# Patient Record
Sex: MALE | Race: BLACK OR AFRICAN AMERICAN | NOT HISPANIC OR LATINO | Employment: FULL TIME | URBAN - METROPOLITAN AREA
[De-identification: names, ages, dates, MRNs, and addresses within clinical notes are randomized per-mention and may not be internally consistent; named-entity substitution may affect disease eponyms.]

---

## 2024-01-04 ENCOUNTER — APPOINTMENT (OUTPATIENT)
Dept: RADIOLOGY | Age: 59
End: 2024-01-04
Payer: COMMERCIAL

## 2024-01-04 ENCOUNTER — OFFICE VISIT (OUTPATIENT)
Dept: OBGYN CLINIC | Facility: CLINIC | Age: 59
End: 2024-01-04
Payer: COMMERCIAL

## 2024-01-04 VITALS
HEART RATE: 93 BPM | SYSTOLIC BLOOD PRESSURE: 152 MMHG | BODY MASS INDEX: 26.55 KG/M2 | HEIGHT: 72 IN | DIASTOLIC BLOOD PRESSURE: 89 MMHG | WEIGHT: 196 LBS

## 2024-01-04 DIAGNOSIS — M25.462 EFFUSION OF LEFT KNEE: ICD-10-CM

## 2024-01-04 DIAGNOSIS — M25.561 PAIN IN BOTH KNEES, UNSPECIFIED CHRONICITY: ICD-10-CM

## 2024-01-04 DIAGNOSIS — M25.562 PAIN IN BOTH KNEES, UNSPECIFIED CHRONICITY: ICD-10-CM

## 2024-01-04 DIAGNOSIS — M23.92 INTERNAL DERANGEMENT OF LEFT KNEE: ICD-10-CM

## 2024-01-04 DIAGNOSIS — M25.561 PAIN IN BOTH KNEES, UNSPECIFIED CHRONICITY: Primary | ICD-10-CM

## 2024-01-04 DIAGNOSIS — M25.562 PAIN IN BOTH KNEES, UNSPECIFIED CHRONICITY: Primary | ICD-10-CM

## 2024-01-04 PROCEDURE — 73562 X-RAY EXAM OF KNEE 3: CPT

## 2024-01-04 PROCEDURE — 73564 X-RAY EXAM KNEE 4 OR MORE: CPT

## 2024-01-04 PROCEDURE — 99203 OFFICE O/P NEW LOW 30 MIN: CPT | Performed by: STUDENT IN AN ORGANIZED HEALTH CARE EDUCATION/TRAINING PROGRAM

## 2024-01-04 NOTE — PROGRESS NOTES
Date: 24  Suhas Jones   MRN# [id number]  : 1965      Chief Complaint: left knee pain/suspicious for acute medial meniscal tear     Assessment and Plan:    Internal derangement left knee  -WBAT  -Activity modification to limit strain or impact on the joint  -ibuprofen (Motrin) as needed  -Tylenol 1000mg up to three times daily as needed. Do not exceed 3000mg daily  -Knee sleeve or brace for comfort  -MRI left knee ordered as patient's exam/clinical findings suspicious for medial meniscal tear, positive joint line tenderness, positive medial Cheng.   -Call patient once MRI done and if positive findings will refer to Dr Redman for arthroscopic procedure.     Subjective:     Knee Pain  Patient complains of left knee pain. This is evaluated as a personal injury. The pain began 4 month ago. Initial twisting injury in August, immediate swelling, 2nd twisting injury in October. Concerned for medial meniscal tear. The pain is located medial.  He describes the symptoms as sharp and stabbing. Symptoms improve with rest, ice. The symptoms are worse with deep knee bending, pivoting or twisting motion, lateral movements. The knee has given out or felt unstable. The patient cannot bend and straighten the knee fully.   Treatment to date has been ice, NSAID's, knee sleeve/brace, with out significant relief.  History ACL reconstruction  in [place].    External Records Reviewed: historical medical records and lab reports    Allergy:  No Known Allergies  Medications:  all current active meds have been reviewed  Past Medical History:  History reviewed. No pertinent past medical history.  Past Surgical History:  Past Surgical History:   Procedure Laterality Date    FOOT SURGERY Right 2015    HAND SURGERY Right     KNEE SURGERY Right      Family History:  Family History   Problem Relation Age of Onset    Leukemia Mother     No Known Problems Father      Social History:  Social History     Substance and  Sexual Activity   Alcohol Use None     Social History     Substance and Sexual Activity   Drug Use Not on file     Social History     Tobacco Use   Smoking Status Never    Passive exposure: Never   Smokeless Tobacco Never           ROS:   Review of Systems   Constitutional:  Negative for chills and fever.   HENT:  Negative for ear pain and sore throat.    Eyes:  Negative for pain and visual disturbance.   Respiratory:  Negative for cough and shortness of breath.    Cardiovascular:  Negative for chest pain and palpitations.   Gastrointestinal:  Negative for abdominal pain and vomiting.   Genitourinary:  Negative for dysuria and hematuria.   Musculoskeletal:  Positive for arthralgias (left knee). Negative for back pain.   Skin:  Negative for color change and rash.   Neurological:  Negative for seizures and syncope.   All other systems reviewed and are negative.       Objective:   BP Readings from Last 1 Encounters:   01/04/24 152/89      Wt Readings from Last 1 Encounters:   01/04/24 88.9 kg (196 lb)      Pulse Readings from Last 1 Encounters:   01/04/24 93      BMI: Estimated body mass index is 26.58 kg/m² as calculated from the following:    Height as of this encounter: 6' (1.829 m).    Weight as of this encounter: 88.9 kg (196 lb).        Physical Exam  Constitutional:       Appearance: He is well-developed.   Eyes:      Pupils: Pupils are equal, round, and reactive to light.   Pulmonary:      Effort: Pulmonary effort is normal.      Breath sounds: Normal breath sounds.   Musculoskeletal:         General: Tenderness (left knee arthralgia) present.   Skin:     General: Skin is warm and dry.   Neurological:      Mental Status: He is alert and oriented to person, place, and time.      Deep Tendon Reflexes: Reflexes are normal and symmetric.   Psychiatric:         Behavior: Behavior normal.         Thought Content: Thought content normal.         Judgment: Judgment normal.          Gait and Station:    antalgic    Bilateral Lower Extremity:  Left Knee:      Inspection:  normal color, temperature, turgor and moisture    Overall limb alignment: varus    Effusion: significant    ROM 0 to 110 with pain    Extensor Lag: Absent    Palpation: Medial joint line tenderness to palpation    stable to AP translation at 90 deg    Positive medial Cheng     Coronal plane stable in full extension    Coronal plane stable in mid-flexion     Motor: 5/5 EHL/FHL/TA/GS/Qd/Hs    Vascular: Toes WWP with BCR    Sensory: SILT DP/SP/Kuldip/Saph/Tib      Images:    I personally reviewed relevant images in the PACS system and my interpretation is as follows:  X-rays of the left knee reveal mild degenerative changes with joint space narrowing and osteophyte formation        Efrain De La Torre MD  Adult Reconstruction Specialist   [facility]    [employer]

## 2024-01-11 ENCOUNTER — HOSPITAL ENCOUNTER (OUTPATIENT)
Dept: MRI IMAGING | Facility: HOSPITAL | Age: 59
End: 2024-01-11
Attending: STUDENT IN AN ORGANIZED HEALTH CARE EDUCATION/TRAINING PROGRAM

## 2024-01-11 DIAGNOSIS — M25.462 EFFUSION OF LEFT KNEE: ICD-10-CM

## 2024-01-11 DIAGNOSIS — M23.92 INTERNAL DERANGEMENT OF LEFT KNEE: ICD-10-CM

## 2024-01-11 PROCEDURE — 73721 MRI JNT OF LWR EXTRE W/O DYE: CPT

## 2024-01-11 PROCEDURE — G1004 CDSM NDSC: HCPCS

## 2024-01-18 ENCOUNTER — OFFICE VISIT (OUTPATIENT)
Dept: OBGYN CLINIC | Facility: CLINIC | Age: 59
End: 2024-01-18
Payer: COMMERCIAL

## 2024-01-18 VITALS
WEIGHT: 198 LBS | SYSTOLIC BLOOD PRESSURE: 144 MMHG | BODY MASS INDEX: 26.82 KG/M2 | HEIGHT: 72 IN | DIASTOLIC BLOOD PRESSURE: 85 MMHG | HEART RATE: 79 BPM

## 2024-01-18 DIAGNOSIS — S83.232D COMPLEX TEAR OF MEDIAL MENISCUS OF LEFT KNEE AS CURRENT INJURY, SUBSEQUENT ENCOUNTER: Primary | ICD-10-CM

## 2024-01-18 DIAGNOSIS — M17.12 PRIMARY OSTEOARTHRITIS OF LEFT KNEE: ICD-10-CM

## 2024-01-18 PROBLEM — S83.232A COMPLEX TEAR OF MEDIAL MENISCUS OF LEFT KNEE AS CURRENT INJURY: Status: ACTIVE | Noted: 2024-01-18

## 2024-01-18 PROCEDURE — 99214 OFFICE O/P EST MOD 30 MIN: CPT | Performed by: STUDENT IN AN ORGANIZED HEALTH CARE EDUCATION/TRAINING PROGRAM

## 2024-01-18 NOTE — PROGRESS NOTES
Date: 24  Suhas Jones   MRN# [id number]  : 1965      Chief Complaint: Left Knee Pain    Assessment and Plan:  The patient verbalized understanding of exam findings and treatment plan. We engaged in the shared decision-making process and treatment options were discussed at length with the patient. Surgical and conservative management discussed today along with risks and benefits. Patient was agree able with the plan and all questions were answered to satisfaction.     Complex meniscus tear, posterior horn medial  Primary osteoarthritis medial compartment left knee    Discussed with the patient may shows a complex tear of the medial mensicus, stress reaction MTP as well as degenerative changes.  Discussed conservative treatment vs surgical intervention.  Patient would like to proceed with left knee arthroscopy.  Referral was placed for evaluation with Dr. Redman  Continue WBAT   Tylenol 1000mg up to three times daily as needed. Do not exceed 3000mg daily  Knee sleeve or brace for comfort  Follow up as needed    Subjective:   Suhas Jones is a 58 y.o. male who is being seen in follow-up for Left knee pain. Patient presents today to discuss the findings of his left knee MRI. When we last saw he we recommended MRI of the left knee .  Pain has mild improved with the knee sleeve. No other orthopedic complaints or concerns.       Prior treatment:  NSAIDs Yes    Bracing Yes   Physical Therapy No   Cortisone Injections No   Viscosupplementation No     External records reviewed: Radiology reports, clinic notes    Allergy:  No Known Allergies  Medications:  All current active meds have been reviewed   Past Medical History:  History reviewed. No pertinent past medical history.  Past Surgical History:  Past Surgical History:   Procedure Laterality Date    FOOT SURGERY Right 2015    HAND SURGERY Right 2005    KNEE SURGERY Right      Family History:  Family History   Problem Relation Age of Onset     Leukemia Mother     No Known Problems Father      Social History:  Social History     Substance and Sexual Activity   Alcohol Use None     Social History     Substance and Sexual Activity   Drug Use Not on file     Social History     Tobacco Use   Smoking Status Never    Passive exposure: Never   Smokeless Tobacco Never           Review of Systems:  General- denies fever/chills  HEENT- denies hearing loss or sore throat  Eyes- denies eye pain or visual disturbances, denies red eyes  Respiratory- denies cough or SOB  Cardio- denies chest pain or palpitations  GI- denies abdominal pain  Endocrine- denies urinary frequency  Urinary- denies pain with urination  Musculoskeletal- Negative except noted above  Skin- denies rashes or wounds  Neurological- denies dizziness or headache  Psychiatric- denies anxiety or difficulty concentrating    Objective:   BP Readings from Last 1 Encounters:   01/18/24 144/85      Wt Readings from Last 1 Encounters:   01/18/24 89.8 kg (198 lb)      Pulse Readings from Last 1 Encounters:   01/18/24 79        BMI: Estimated body mass index is 26.85 kg/m² as calculated from the following:    Height as of this encounter: 6' (1.829 m).    Weight as of this encounter: 89.8 kg (198 lb).    Physical Exam  /85   Pulse 79   Ht 6' (1.829 m)   Wt 89.8 kg (198 lb)   BMI 26.85 kg/m²   General/Constitutional: No apparent distress: well-nourished and well developed.  Eyes: normal ocular motion  Cardio: RRR, Normal S1S2, No m/r/g.   Lymphatic: No appreciable lymphadenopathy  Respiratory: Non-labored breathing, CTA b/l no w/c/r  Vascular: No edema, swelling or tenderness, except as noted in detailed exam. Extremities well perfused. No LE edema  Integumentary: No impressive skin lesions present, except as noted in detailed exam.  Neuro: No ataxia or tremors noted  Psych: Normal mood and affect, oriented to person, place and time. Appropriate affect.  Musculoskeletal: Normal, except as noted in  detailed exam and in HPI.    Gait and Station:   antalgic    Left Knee Exam:      Inspection:  normal color, temperature, turgor and moisture    Overall limb alignment: neutral    Effusion: no    ROM 0 to 100 limited by pain    Extensor Lag: Absent    Palpation: Medial joint line tenderness to palpation    stable to AP translation at 90 deg    Coronal plane stable in full extension    Coronal plane stable in mid-flexion     Motor: 5/5 EHL/FHL/TA/GS/Qd/Hs    Vascular: Toes WWP with BCR    Sensory: SILT DP/SP/Kuldip/Saph/Ti      Images:  I personally reviewed relevant images in the PACS system and my interpretation is as follows:    MRI available for review of Left knee was available for review which demonstrates complex tear posterior horn medial meniscus, stress reaction posterior horn medial meniscus, mild to moderate degenerative changes medial joint line.           Scribe Attestation      I,:  Shavon Avila am acting as a scribe while in the presence of the attending physician.:       I,:  Efrain De La Torre MD personally performed the services described in this documentation    as scribed in my presence.:               Efrain De La Torre MD  Adult Reconstruction Specialist   [facility]

## 2024-01-19 ENCOUNTER — OFFICE VISIT (OUTPATIENT)
Dept: LAB | Age: 59
End: 2024-01-19
Payer: COMMERCIAL

## 2024-01-19 ENCOUNTER — OFFICE VISIT (OUTPATIENT)
Dept: OBGYN CLINIC | Facility: CLINIC | Age: 59
End: 2024-01-19
Payer: COMMERCIAL

## 2024-01-19 ENCOUNTER — TELEPHONE (OUTPATIENT)
Age: 59
End: 2024-01-19

## 2024-01-19 ENCOUNTER — APPOINTMENT (OUTPATIENT)
Dept: LAB | Age: 59
End: 2024-01-19
Payer: COMMERCIAL

## 2024-01-19 VITALS
DIASTOLIC BLOOD PRESSURE: 84 MMHG | BODY MASS INDEX: 26.68 KG/M2 | SYSTOLIC BLOOD PRESSURE: 145 MMHG | WEIGHT: 197 LBS | HEIGHT: 72 IN | HEART RATE: 84 BPM

## 2024-01-19 DIAGNOSIS — S83.232D COMPLEX TEAR OF MEDIAL MENISCUS OF LEFT KNEE AS CURRENT INJURY, SUBSEQUENT ENCOUNTER: ICD-10-CM

## 2024-01-19 DIAGNOSIS — Z01.818 PRE-OPERATIVE CLEARANCE: ICD-10-CM

## 2024-01-19 DIAGNOSIS — S83.232D COMPLEX TEAR OF MEDIAL MENISCUS OF LEFT KNEE AS CURRENT INJURY, SUBSEQUENT ENCOUNTER: Primary | ICD-10-CM

## 2024-01-19 LAB
ALBUMIN SERPL BCP-MCNC: 4.5 G/DL (ref 3.5–5)
ALP SERPL-CCNC: 67 U/L (ref 34–104)
ALT SERPL W P-5'-P-CCNC: 11 U/L (ref 7–52)
ANION GAP SERPL CALCULATED.3IONS-SCNC: 6 MMOL/L
AST SERPL W P-5'-P-CCNC: 10 U/L (ref 13–39)
BASOPHILS # BLD AUTO: 0.05 THOUSANDS/ÂΜL (ref 0–0.1)
BASOPHILS NFR BLD AUTO: 1 % (ref 0–1)
BILIRUB SERPL-MCNC: 0.65 MG/DL (ref 0.2–1)
BUN SERPL-MCNC: 19 MG/DL (ref 5–25)
CALCIUM SERPL-MCNC: 9.6 MG/DL (ref 8.4–10.2)
CHLORIDE SERPL-SCNC: 101 MMOL/L (ref 96–108)
CO2 SERPL-SCNC: 31 MMOL/L (ref 21–32)
CREAT SERPL-MCNC: 0.95 MG/DL (ref 0.6–1.3)
EOSINOPHIL # BLD AUTO: 0.21 THOUSAND/ÂΜL (ref 0–0.61)
EOSINOPHIL NFR BLD AUTO: 5 % (ref 0–6)
ERYTHROCYTE [DISTWIDTH] IN BLOOD BY AUTOMATED COUNT: 11.5 % (ref 11.6–15.1)
GFR SERPL CREATININE-BSD FRML MDRD: 89 ML/MIN/1.73SQ M
GLUCOSE SERPL-MCNC: 326 MG/DL (ref 65–140)
HCT VFR BLD AUTO: 42.5 % (ref 36.5–49.3)
HGB BLD-MCNC: 13.5 G/DL (ref 12–17)
IMM GRANULOCYTES # BLD AUTO: 0.01 THOUSAND/UL (ref 0–0.2)
IMM GRANULOCYTES NFR BLD AUTO: 0 % (ref 0–2)
LYMPHOCYTES # BLD AUTO: 1.59 THOUSANDS/ÂΜL (ref 0.6–4.47)
LYMPHOCYTES NFR BLD AUTO: 35 % (ref 14–44)
MCH RBC QN AUTO: 31.4 PG (ref 26.8–34.3)
MCHC RBC AUTO-ENTMCNC: 31.8 G/DL (ref 31.4–37.4)
MCV RBC AUTO: 99 FL (ref 82–98)
MONOCYTES # BLD AUTO: 0.43 THOUSAND/ÂΜL (ref 0.17–1.22)
MONOCYTES NFR BLD AUTO: 9 % (ref 4–12)
NEUTROPHILS # BLD AUTO: 2.29 THOUSANDS/ÂΜL (ref 1.85–7.62)
NEUTS SEG NFR BLD AUTO: 50 % (ref 43–75)
NRBC BLD AUTO-RTO: 0 /100 WBCS
PLATELET # BLD AUTO: 213 THOUSANDS/UL (ref 149–390)
PMV BLD AUTO: 12.4 FL (ref 8.9–12.7)
POTASSIUM SERPL-SCNC: 4.3 MMOL/L (ref 3.5–5.3)
PROT SERPL-MCNC: 7.2 G/DL (ref 6.4–8.4)
RBC # BLD AUTO: 4.3 MILLION/UL (ref 3.88–5.62)
SODIUM SERPL-SCNC: 138 MMOL/L (ref 135–147)
WBC # BLD AUTO: 4.58 THOUSAND/UL (ref 4.31–10.16)

## 2024-01-19 PROCEDURE — 99214 OFFICE O/P EST MOD 30 MIN: CPT | Performed by: ORTHOPAEDIC SURGERY

## 2024-01-19 PROCEDURE — 93005 ELECTROCARDIOGRAM TRACING: CPT

## 2024-01-19 PROCEDURE — 85025 COMPLETE CBC W/AUTO DIFF WBC: CPT

## 2024-01-19 PROCEDURE — 80053 COMPREHEN METABOLIC PANEL: CPT

## 2024-01-19 PROCEDURE — 36415 COLL VENOUS BLD VENIPUNCTURE: CPT

## 2024-01-19 RX ORDER — CHLORHEXIDINE GLUCONATE ORAL RINSE 1.2 MG/ML
15 SOLUTION DENTAL ONCE
Status: CANCELLED | OUTPATIENT
Start: 2024-01-19 | End: 2024-01-19

## 2024-01-19 RX ORDER — CEFAZOLIN SODIUM 2 G/50ML
2000 SOLUTION INTRAVENOUS ONCE
Status: CANCELLED | OUTPATIENT
Start: 2024-01-24 | End: 2024-01-19

## 2024-01-19 NOTE — PROGRESS NOTES
CHIEF COMPLAINT/REASON FOR VISIT  Chief Complaint   Patient presents with    Left Knee - Pain        HISTORY OF PRESENT ILLNESS  Suhas Jones is a 58 y.o. male who presents for evaluation of his left knee. Patient said he initially injured his right in July 2023. He said the pain improved from this injury but in October 2023 he twisted his knee and re-injured his knee again. Patient said he deals with constant medial pain that he rates as a 7-9/10 pain. He said he has a hard time extending and bending his knee. He has tried numerous conservative measures which has not given significant improvement. He denies prior injury to July injury.    REVIEW OF SYSTEMS  Review of systems was performed and, outside that mentioned in the HPI, it was negative for symptomology related to the integumentary, hematologic, immunologic, allergic, neurologic, cardiovascular, respiratory, GI or  systems.    MEDICAL HISTORY  Patient Active Problem List   Diagnosis    Internal derangement of left knee    Complex tear of medial meniscus of left knee as current injury    Primary osteoarthritis of left knee       SURGICAL HISTORY  Past Surgical History:   Procedure Laterality Date    FOOT SURGERY Right 2015    HAND SURGERY Right 2005    KNEE SURGERY Right 1995       CURRENT MEDICATIONS  No current outpatient medications on file.    SOCIAL HISTORY  Social History     Socioeconomic History    Marital status: Single     Spouse name: Not on file    Number of children: Not on file    Years of education: Not on file    Highest education level: Not on file   Occupational History    Not on file   Tobacco Use    Smoking status: Never     Passive exposure: Never    Smokeless tobacco: Never   Substance and Sexual Activity    Alcohol use: Not on file    Drug use: Not on file    Sexual activity: Not on file   Other Topics Concern    Not on file   Social History Narrative    Not on file     Social Determinants of Health     Financial Resource Strain: Not  on file   Food Insecurity: Not on file   Transportation Needs: Not on file   Physical Activity: Not on file   Stress: Not on file   Social Connections: Not on file   Intimate Partner Violence: Not on file   Housing Stability: Not on file       Objective     VITAL SIGNS  /84 (BP Location: Left arm, Patient Position: Sitting, Cuff Size: Large)   Pulse 84   Ht 6' (1.829 m) Comment: verbal  Wt 89.4 kg (197 lb)   BMI 26.72 kg/m²        PHYSICAL EXAMINATION    Musculoskeletal: Left Knee Examination:  General: The patient is alert, oriented, and pleasant to interact with.  Patient ambulates with Normal and Antalgic gait pattern  Assistive Device: No  Alignment: normal  Skin is warm and dry to touch with no signs of erythema, ecchymosis, or infection   Effusion: minimal  ROM: 0° - 90°  verus 0° - 135° on contralateral side  MMT: deferred  TTP: Medial joint line  Flexor and extensor mechanisms are intact   Knee is stable to varus and valgus stress  Cheng's Test Positive Medial   2+ DP and PT pulses with brisk capillary refill to the toes  Sural, saphenous, tibial, superficial, and deep peroneal motor and sensory distributions intact  Sensation light touch intact distally      RADIOGRAPHIC EXAMINATION/DIAGNOSTICS:  Procedure: MRI knee left  wo contrast    Result Date: 1/15/2024  Narrative: MRI LEFT KNEE INDICATION:   M25.462: Effusion, left knee M23.92: Unspecified internal derangement of left knee. COMPARISON: Correlation is made with the prior radiograph dated 1/4/2024. TECHNIQUE:    Multiplanar/multisequence MR of the left knee was performed. Imaging performed on 1.5T MRI FINDINGS: SUBCUTANEOUS TISSUES: Normal JOINT EFFUSION: Moderate to large joint effusion. BAKER'S CYST: Very tiny Baker's cyst. MENISCI: There is a complex tear of the posterior horn of the medial meniscus extending to its junction with the body with horizontal, longitudinal, and radial components. There is mild medial extrusion of the medial  meniscus. The lateral meniscus is intact. CRUCIATE LIGAMENTS: Intact. EXTENSOR APPARATUS: Intact. COLLATERAL LIGAMENTS: Intact. ARTICULAR SURFACES: There is mild to moderate cartilage thinning and fissuring along the weightbearing portion of the medial femoral tibial compartment. There is minimal thinning along the weightbearing portion of the lateral femoral tibial compartment. There is mild patellofemoral compartment cartilage thinning. BONES: There is marrow edema within the medial tibial plateau likely representing stress response. MUSCULATURE:  Intact.     Impression: Complex tear of the posterior horn of the medial meniscus extending to its junction with the body with mild medial extrusion. Stress response in the medial tibial plateau. Mild to moderate cartilage thinning weightbearing portion of the medial femoral tibial compartment. Minimal thinning along the weightbearing portion of the lateral femoral tibial compartment. Mild patellofemoral compartment cartilage thinning. Moderate to large joint effusion. Workstation performed: [id number]     Procedure: XR knee 4+ vw left injury    Result Date: 1/4/2024  Narrative: LEFT KNEE INDICATION:   Pain in right knee. Pain in left knee. COMPARISON:  There are no previous examinations available for comparison. VIEWS:  XR KNEE 4+ VW LEFT INJURY FINDINGS: There is no acute fracture or dislocation. There is a moderate joint effusion. No significant degenerative compartment space narrowing. Small spurs of the tibial spines. Enthesophytes of the superior patella. Prominence of the tibial tubercle. No lytic or blastic osseous lesion. Soft tissues are unremarkable.     Impression: No significant degenerative compartment space narrowing although there are mild hypertrophic changes with tibial spine spurs and patellar enthesopathy. Moderate effusion. No fracture Electronically signed: 01/04/2024 04:20 PM Scotty Thomas MD    Procedure: XR knee 3 vw right non  injury    Result Date: 1/4/2024  Narrative: RIGHT KNEE INDICATION:   Pain in right knee. Pain in left knee.   COMPARISON:  There are no previous examinations available for comparison. VIEWS:  XR KNEE 3 VW RIGHT NON INJURY FINDINGS: There is no acute fracture or dislocation. Joint effusion cannot be reliably evaluated without lateral view. No medial and lateral compartment spaces are preserved. Patellofemoral space is also relatively preserved with small lateral enthesophytes. Postsurgical changes are noted likely ACL repair, with surgical screws in the distal femur, tibial plateau region and surgical defect in the anterior aspect of the distal femur and proximal tibia. No lytic or blastic osseous lesion. Soft tissues are unremarkable.     Impression: Postsurgical changes likely ACL repair of the right knee as above. Preserved compartment spaces. No significant degenerative joint space narrowing. Mild patellar enthesopathy laterally. Electronically signed: 01/04/2024 04:17 PM Scotty Thomas MD     ASSESSMENT/PLAN:  left knee arthroscopy, medial meniscus repair versus partial meniscectomy, chondroplasty as needed, and all  other indicated procedures.    We discussed the importance of injury to the meniscus. We also discussed the role of the menisci as a shock absorber, but also as a secondary stabilizer of the knee in terms of stability. We also described that there can be associated wear and degeneration of the articular cartilage which may also be playing a role in his symptoms. Sometimes this can be difficult to distinguish from meniscus symptoms.    Options for treatment of the meniscus include partial meniscectomy versus repair. Options for treatment of the meniscus include partial meniscectomy versus repair in this setting. If repair is considered, this is more preserving for the meniscus tissue. However, there is also a chance a repair may not heal.    We discussed the typical rehabilitation following meniscus  partial debridement. There is certainly a risk of re injury returning to twisting and pivoting activities. We discussed risks of surgery, which include knee stiffness, infection, risk of reinjury and future arthritis.    He will call to schedule knee arthroscopy with meniscus debridement as indicated. He will need a preoperative listing appointment and physical therapy.         Scribe Attestation      I,:  Jericho Lua PA-C am acting as a scribe while in the presence of the attending physician.:       I,:  Himanshu Redman MD personally performed the services described in this documentation    as scribed in my presence.:

## 2024-01-19 NOTE — H&P (VIEW-ONLY)
CHIEF COMPLAINT/REASON FOR VISIT  Chief Complaint   Patient presents with    Left Knee - Pain        HISTORY OF PRESENT ILLNESS  Suhas Jones is a 58 y.o. male who presents for evaluation of his left knee. Patient said he initially injured his right in July 2023. He said the pain improved from this injury but in October 2023 he twisted his knee and re-injured his knee again. Patient said he deals with constant medial pain that he rates as a 7-9/10 pain. He said he has a hard time extending and bending his knee. He has tried numerous conservative measures which has not given significant improvement. He denies prior injury to July injury.    REVIEW OF SYSTEMS  Review of systems was performed and, outside that mentioned in the HPI, it was negative for symptomology related to the integumentary, hematologic, immunologic, allergic, neurologic, cardiovascular, respiratory, GI or  systems.    MEDICAL HISTORY  Patient Active Problem List   Diagnosis    Internal derangement of left knee    Complex tear of medial meniscus of left knee as current injury    Primary osteoarthritis of left knee       SURGICAL HISTORY  Past Surgical History:   Procedure Laterality Date    FOOT SURGERY Right 2015    HAND SURGERY Right 2005    KNEE SURGERY Right 1995       CURRENT MEDICATIONS  No current outpatient medications on file.    SOCIAL HISTORY  Social History     Socioeconomic History    Marital status: Single     Spouse name: Not on file    Number of children: Not on file    Years of education: Not on file    Highest education level: Not on file   Occupational History    Not on file   Tobacco Use    Smoking status: Never     Passive exposure: Never    Smokeless tobacco: Never   Substance and Sexual Activity    Alcohol use: Not on file    Drug use: Not on file    Sexual activity: Not on file   Other Topics Concern    Not on file   Social History Narrative    Not on file     Social Determinants of Health     Financial Resource Strain: Not  on file   Food Insecurity: Not on file   Transportation Needs: Not on file   Physical Activity: Not on file   Stress: Not on file   Social Connections: Not on file   Intimate Partner Violence: Not on file   Housing Stability: Not on file       Objective     VITAL SIGNS  /84 (BP Location: Left arm, Patient Position: Sitting, Cuff Size: Large)   Pulse 84   Ht 6' (1.829 m) Comment: verbal  Wt 89.4 kg (197 lb)   BMI 26.72 kg/m²        PHYSICAL EXAMINATION    Musculoskeletal: Left Knee Examination:  General: The patient is alert, oriented, and pleasant to interact with.  Patient ambulates with Normal and Antalgic gait pattern  Assistive Device: No  Alignment: normal  Skin is warm and dry to touch with no signs of erythema, ecchymosis, or infection   Effusion: minimal  ROM: 0° - 90°  verus 0° - 135° on contralateral side  MMT: deferred  TTP: Medial joint line  Flexor and extensor mechanisms are intact   Knee is stable to varus and valgus stress  Cheng's Test Positive Medial   2+ DP and PT pulses with brisk capillary refill to the toes  Sural, saphenous, tibial, superficial, and deep peroneal motor and sensory distributions intact  Sensation light touch intact distally      RADIOGRAPHIC EXAMINATION/DIAGNOSTICS:  Procedure: MRI knee left  wo contrast    Result Date: 1/15/2024  Narrative: MRI LEFT KNEE INDICATION:   M25.462: Effusion, left knee M23.92: Unspecified internal derangement of left knee. COMPARISON: Correlation is made with the prior radiograph dated 1/4/2024. TECHNIQUE:    Multiplanar/multisequence MR of the left knee was performed. Imaging performed on 1.5T MRI FINDINGS: SUBCUTANEOUS TISSUES: Normal JOINT EFFUSION: Moderate to large joint effusion. BAKER'S CYST: Very tiny Baker's cyst. MENISCI: There is a complex tear of the posterior horn of the medial meniscus extending to its junction with the body with horizontal, longitudinal, and radial components. There is mild medial extrusion of the medial  meniscus. The lateral meniscus is intact. CRUCIATE LIGAMENTS: Intact. EXTENSOR APPARATUS: Intact. COLLATERAL LIGAMENTS: Intact. ARTICULAR SURFACES: There is mild to moderate cartilage thinning and fissuring along the weightbearing portion of the medial femoral tibial compartment. There is minimal thinning along the weightbearing portion of the lateral femoral tibial compartment. There is mild patellofemoral compartment cartilage thinning. BONES: There is marrow edema within the medial tibial plateau likely representing stress response. MUSCULATURE:  Intact.     Impression: Complex tear of the posterior horn of the medial meniscus extending to its junction with the body with mild medial extrusion. Stress response in the medial tibial plateau. Mild to moderate cartilage thinning weightbearing portion of the medial femoral tibial compartment. Minimal thinning along the weightbearing portion of the lateral femoral tibial compartment. Mild patellofemoral compartment cartilage thinning. Moderate to large joint effusion. Workstation performed: [id number]     Procedure: XR knee 4+ vw left injury    Result Date: 1/4/2024  Narrative: LEFT KNEE INDICATION:   Pain in right knee. Pain in left knee. COMPARISON:  There are no previous examinations available for comparison. VIEWS:  XR KNEE 4+ VW LEFT INJURY FINDINGS: There is no acute fracture or dislocation. There is a moderate joint effusion. No significant degenerative compartment space narrowing. Small spurs of the tibial spines. Enthesophytes of the superior patella. Prominence of the tibial tubercle. No lytic or blastic osseous lesion. Soft tissues are unremarkable.     Impression: No significant degenerative compartment space narrowing although there are mild hypertrophic changes with tibial spine spurs and patellar enthesopathy. Moderate effusion. No fracture Electronically signed: 01/04/2024 04:20 PM Scotty Thomas MD    Procedure: XR knee 3 vw right non  injury    Result Date: 1/4/2024  Narrative: RIGHT KNEE INDICATION:   Pain in right knee. Pain in left knee.   COMPARISON:  There are no previous examinations available for comparison. VIEWS:  XR KNEE 3 VW RIGHT NON INJURY FINDINGS: There is no acute fracture or dislocation. Joint effusion cannot be reliably evaluated without lateral view. No medial and lateral compartment spaces are preserved. Patellofemoral space is also relatively preserved with small lateral enthesophytes. Postsurgical changes are noted likely ACL repair, with surgical screws in the distal femur, tibial plateau region and surgical defect in the anterior aspect of the distal femur and proximal tibia. No lytic or blastic osseous lesion. Soft tissues are unremarkable.     Impression: Postsurgical changes likely ACL repair of the right knee as above. Preserved compartment spaces. No significant degenerative joint space narrowing. Mild patellar enthesopathy laterally. Electronically signed: 01/04/2024 04:17 PM Scotty Thomas MD     ASSESSMENT/PLAN:  left knee arthroscopy, medial meniscus repair versus partial meniscectomy, chondroplasty as needed, and all  other indicated procedures.    We discussed the importance of injury to the meniscus. We also discussed the role of the menisci as a shock absorber, but also as a secondary stabilizer of the knee in terms of stability. We also described that there can be associated wear and degeneration of the articular cartilage which may also be playing a role in his symptoms. Sometimes this can be difficult to distinguish from meniscus symptoms.    Options for treatment of the meniscus include partial meniscectomy versus repair. Options for treatment of the meniscus include partial meniscectomy versus repair in this setting. If repair is considered, this is more preserving for the meniscus tissue. However, there is also a chance a repair may not heal.    We discussed the typical rehabilitation following meniscus  partial debridement. There is certainly a risk of re injury returning to twisting and pivoting activities. We discussed risks of surgery, which include knee stiffness, infection, risk of reinjury and future arthritis.    He will call to schedule knee arthroscopy with meniscus debridement as indicated. He will need a preoperative listing appointment and physical therapy.         Scribe Attestation      I,:  Jericho Lua PA-C am acting as a scribe while in the presence of the attending physician.:       I,:  Himanshu Redman MD personally performed the services described in this documentation    as scribed in my presence.:

## 2024-01-20 LAB
ATRIAL RATE: 76 BPM
ATRIAL RATE: 76 BPM
P AXIS: 43 DEGREES
P AXIS: 46 DEGREES
PR INTERVAL: 134 MS
PR INTERVAL: 136 MS
QRS AXIS: 61 DEGREES
QRS AXIS: 62 DEGREES
QRSD INTERVAL: 82 MS
QRSD INTERVAL: 82 MS
QT INTERVAL: 406 MS
QT INTERVAL: 408 MS
QTC INTERVAL: 456 MS
QTC INTERVAL: 459 MS
T WAVE AXIS: 49 DEGREES
T WAVE AXIS: 51 DEGREES
VENTRICULAR RATE: 76 BPM
VENTRICULAR RATE: 76 BPM

## 2024-01-20 PROCEDURE — 93010 ELECTROCARDIOGRAM REPORT: CPT | Performed by: INTERNAL MEDICINE

## 2024-01-22 ENCOUNTER — ANESTHESIA EVENT (OUTPATIENT)
Age: 59
End: 2024-01-22
Payer: COMMERCIAL

## 2024-01-22 ENCOUNTER — CONSULT (OUTPATIENT)
Dept: INTERNAL MEDICINE CLINIC | Age: 59
End: 2024-01-22
Payer: COMMERCIAL

## 2024-01-22 VITALS
SYSTOLIC BLOOD PRESSURE: 128 MMHG | DIASTOLIC BLOOD PRESSURE: 76 MMHG | WEIGHT: 191.7 LBS | HEART RATE: 89 BPM | BODY MASS INDEX: 27.44 KG/M2 | TEMPERATURE: 97.2 F | OXYGEN SATURATION: 96 % | HEIGHT: 70 IN

## 2024-01-22 DIAGNOSIS — E11.69 TYPE 2 DIABETES MELLITUS WITH OTHER SPECIFIED COMPLICATION, UNSPECIFIED WHETHER LONG TERM INSULIN USE (HCC): ICD-10-CM

## 2024-01-22 DIAGNOSIS — Z12.11 SCREENING FOR MALIGNANT NEOPLASM OF COLON: ICD-10-CM

## 2024-01-22 DIAGNOSIS — Z01.818 PRE-OP EXAMINATION: Primary | ICD-10-CM

## 2024-01-22 DIAGNOSIS — Z12.5 PROSTATE CANCER SCREENING: ICD-10-CM

## 2024-01-22 DIAGNOSIS — S83.232D COMPLEX TEAR OF MEDIAL MENISCUS OF LEFT KNEE AS CURRENT INJURY, SUBSEQUENT ENCOUNTER: ICD-10-CM

## 2024-01-22 LAB
CREAT UR-MCNC: 151.6 MG/DL
MICROALBUMIN UR-MCNC: 18 MG/L
MICROALBUMIN/CREAT 24H UR: 12 MG/G CREATININE (ref 0–30)
SL AMB  POCT GLUCOSE, UA: 500
SL AMB LEUKOCYTE ESTERASE,UA: NORMAL
SL AMB POCT BILIRUBIN,UA: NORMAL
SL AMB POCT BLOOD,UA: NORMAL
SL AMB POCT CLARITY,UA: CLEAR
SL AMB POCT COLOR,UA: YELLOW
SL AMB POCT HEMOGLOBIN AIC: 8.9 (ref ?–6.5)
SL AMB POCT KETONES,UA: NORMAL
SL AMB POCT NITRITE,UA: NORMAL
SL AMB POCT PH,UA: 5.5
SL AMB POCT SPECIFIC GRAVITY,UA: 1.02
SL AMB POCT URINE PROTEIN: NORMAL
SL AMB POCT UROBILINOGEN: 0.2

## 2024-01-22 PROCEDURE — 82570 ASSAY OF URINE CREATININE: CPT | Performed by: STUDENT IN AN ORGANIZED HEALTH CARE EDUCATION/TRAINING PROGRAM

## 2024-01-22 PROCEDURE — 82043 UR ALBUMIN QUANTITATIVE: CPT | Performed by: STUDENT IN AN ORGANIZED HEALTH CARE EDUCATION/TRAINING PROGRAM

## 2024-01-22 PROCEDURE — 81003 URINALYSIS AUTO W/O SCOPE: CPT | Performed by: STUDENT IN AN ORGANIZED HEALTH CARE EDUCATION/TRAINING PROGRAM

## 2024-01-22 PROCEDURE — 99204 OFFICE O/P NEW MOD 45 MIN: CPT | Performed by: STUDENT IN AN ORGANIZED HEALTH CARE EDUCATION/TRAINING PROGRAM

## 2024-01-22 PROCEDURE — 83036 HEMOGLOBIN GLYCOSYLATED A1C: CPT | Performed by: STUDENT IN AN ORGANIZED HEALTH CARE EDUCATION/TRAINING PROGRAM

## 2024-01-22 RX ORDER — DIPHENOXYLATE HYDROCHLORIDE AND ATROPINE SULFATE 2.5; .025 MG/1; MG/1
1 TABLET ORAL DAILY
COMMUNITY

## 2024-01-22 NOTE — PROGRESS NOTES
Diabetic Foot Exam    Patient's shoes and socks removed.    Right Foot/Ankle   Right Foot Inspection  Skin Exam: skin normal and skin intact. No dry skin, no warmth, no callus, no erythema, no maceration, no abnormal color, no pre-ulcer, no ulcer and no callus. Amputation: amputation right foot     Toe Exam: ROM and strength within normal limits.     Sensory   Monofilament testing: intact    Vascular  The right DP pulse is 2+. The right PT pulse is 2+.     Left Foot/Ankle  Left Foot Inspection  Skin Exam: skin normal and skin intact. No dry skin, no warmth, no erythema, no maceration, normal color, no pre-ulcer, no ulcer and no callus.     Toe Exam: ROM and strength within normal limits.     Sensory   Monofilament testing: intact    Vascular  The left DP pulse is 2+. The left PT pulse is 2+.     Assign Risk Category  No deformity present  No loss of protective sensation  No weak pulses  Risk: 0

## 2024-01-22 NOTE — PROGRESS NOTES
[facility]  INTERNAL MEDICINE OFFICE VISIT     PATIENT INFORMATION     Suhas Jones   58 y.o. male   MRN: [id number]    ASSESSMENT/PLAN     1. Pre-op examination  - EKG performed on 01/19/2024 reviewed.  Showed left atrial lodgment left ventricular hypertrophy.  -Patient unfortunately with uncontrolled type 2 diabetes mellitus.  A1c in office today 8.9%.  -Will begin medications in an effort to lower A1c.  Patient will require close PCP follow-up.  -   Risk Factor Score (Yes=1, No=0)   Hx of TIA/CVA 0   Hx of prior ischemic heart disease (AMI, unstable angina, Q waves on EKG, CABG) 0   Hx of congestive heart failure 0   Serum Creatinine >2 mg/dl 0   Insulin dependent diabetes mellitus 0   Total Score 0     Risk of MACE (30-day)     Points Risk % (95% CI), Shanti, 2017 Risk % (95% CI) Miguel, 1999   0 3.9 (2.8-5.4) 0.4 (0.05-1.5)   1 6 (4.9-7.4) 0.9 (0.3-2.1)   2 10.1 (8.1-12.6) 6.6 (3.9-10.3)   3 or more 15 (11.1-20) >11 (5.8-18.4)       Advice    In patients with 3 or more RCRI risk factors (e.g., diabetes mellitus, HF, coronary artery disease, renal insufficiency, cerebrovascular accident), it may be reasonable to begin beta blockers before surgery. (Level of Evidence: B)    Beta-blocker therapy should not be started on the day of surgery (Level of Evidence: B)    In patients with a compelling long-term indication for betablocker therapy but no other RCRI risk factors, initiating beta blockers in the perioperative setting as an approach to reduce perioperative risk is of uncertain benefit  (Level of Evidence: B)    -Patient is at low risk for low risk procedure.  Patient may proceed with scheduled left knee arthroscopy and potential medial meniscal repair as scheduled on 01/24/2024 without any further testing.       2. Complex tear of medial meniscus of left knee as current injury, subsequent encounter  -     Patient may proceed to scheduled left knee arthroscopy on 01/20/2024.    3.  Screening for malignant neoplasm of colon  -     Ambulatory Referral to Gastroenterology; Future    4. Type 2 diabetes mellitus with other specified complication, unspecified whether long term insulin use (HCC)  -     POCT urine dip auto non-scope  -     POCT hemoglobin A1c  -     Albumin / creatinine urine ratio; Future  -     metFORMIN (GLUCOPHAGE) 500 mg tablet; Take 1 tablet (500 mg total) by mouth 3 (three) times a day  -     sitaGLIPtin (JANUVIA) 100 mg tablet; Take 1 tablet (100 mg total) by mouth daily  -     Hemoglobin A1C; Future  -     Lipid panel; Future  - Diabetic foot exam performed in office  - Referral for eye exam placed today    5. Prostate cancer screening  -     PSA, Total Screen; Future       HEALTH MAINTENANCE     There is no immunization history on file for this patient.        CHIEF COMPLAINT     Chief Complaint   Patient presents with    Pre-op Clearance     Medical clearance, left knee surgery on 1/24/24- Dr. Redman, labs and EKG done on 1/19       HISTORY OF PRESENT ILLNESS     Mr. Suhas Jones is a 58-year-old male who is new to this practice and being seen today for preoperative examination.  Patient has past medical history of primary osteoarthritis of left knee and complex tear of medial meniscus of left knee.  Patient had previously had a primary care physician in [place] and was last in office per chart review in July 2022.  At that time had medical history of diabetes and hypertension.  His A1c was 10.5%.  He was prescribed Basaglar 12 units nightly and Humalog 1 unit with meals.  He was not taking medications for hypertension.  In office today, patient states that he has never started insulin and is never taken medications for diabetes.  On 01/19/2024 patient had CMP which had glucose elevated to 326.  CBC was with MCV elevated to 99 and RDW of 11.56.  He saw orthopedics in office on 01/19/2024 and a plan is for left knee arthroscopy with medial meniscus repair versus partial  "meniscectomy on 01/24/2024.  He presents in clinic today with no acute concerns or complaints.  When questioned, he does offer that he is very thirsty and urinates frequently.  Discussed the findings of his elevated blood glucose on CMP and concern for uncontrolled diabetes.  Point-of-care A1c and urine dip completed in office today.  Patient is agreeable to starting treatment for diabetes.  He presently denies any fever, chills, nausea, vomiting, diarrhea, constipation, chest pain, shortness of breath.     REVIEW OF SYSTEMS     Review of Systems   Constitutional:  Negative for chills, fatigue and fever.   HENT:  Negative for congestion, rhinorrhea and sore throat.    Respiratory:  Negative for cough, shortness of breath and wheezing.    Cardiovascular:  Negative for chest pain, palpitations and leg swelling.   Gastrointestinal:  Negative for abdominal pain, constipation, diarrhea, nausea and vomiting.   Endocrine: Positive for polydipsia and polyuria.   Genitourinary:  Positive for frequency. Negative for difficulty urinating and dysuria.   Musculoskeletal:  Positive for arthralgias and gait problem. Negative for back pain.   Skin:  Negative for rash and wound.   Neurological:  Negative for dizziness, syncope, weakness, light-headedness, numbness and headaches.   Psychiatric/Behavioral:  Negative for agitation, behavioral problems and confusion.      OBJECTIVE     Vitals:    01/22/24 1314   BP: 128/76   BP Location: Left arm   Patient Position: Sitting   Cuff Size: Large   Pulse: 89   Temp: (!) 97.2 °F (36.2 °C)   TempSrc: Temporal   SpO2: 96%   Weight: 87 kg (191 lb 11.2 oz)   Height: 5' 10.47\" (1.79 m)     Physical Exam  Constitutional:       Appearance: Normal appearance.   Cardiovascular:      Rate and Rhythm: Normal rate and regular rhythm.      Pulses: Normal pulses.      Heart sounds: Normal heart sounds. No murmur heard.  Pulmonary:      Effort: Pulmonary effort is normal. No respiratory distress.      " Breath sounds: Normal breath sounds. No wheezing, rhonchi or rales.   Abdominal:      General: Abdomen is flat. Bowel sounds are normal. There is no distension.      Palpations: Abdomen is soft.      Tenderness: There is no abdominal tenderness.   Musculoskeletal:      Right lower leg: No edema.      Left lower leg: No edema.   Skin:     General: Skin is warm and dry.   Neurological:      Mental Status: He is alert and oriented to person, place, and time.      Motor: No weakness.   Psychiatric:         Mood and Affect: Mood normal.         Behavior: Behavior normal.         Thought Content: Thought content normal.       CURRENT MEDICATIONS     Current Outpatient Medications:     metFORMIN (GLUCOPHAGE) 500 mg tablet, Take 1 tablet (500 mg total) by mouth 3 (three) times a day, Disp: 90 tablet, Rfl: 3    multivitamin (THERAGRAN) TABS, Take 1 tablet by mouth daily, Disp: , Rfl:     sitaGLIPtin (JANUVIA) 100 mg tablet, Take 1 tablet (100 mg total) by mouth daily, Disp: 30 tablet, Rfl: 3    PAST MEDICAL & SURGICAL HISTORY   History reviewed. No pertinent past medical history.  Past Surgical History:   Procedure Laterality Date    FOOT SURGERY Right 2015    HAND SURGERY Right 2005    KNEE SURGERY Right 1995     SOCIAL & FAMILY HISTORY     Social History     Socioeconomic History    Marital status: Single     Spouse name: Not on file    Number of children: Not on file    Years of education: Not on file    Highest education level: Not on file   Occupational History    Not on file   Tobacco Use    Smoking status: Never     Passive exposure: Never    Smokeless tobacco: Never   Vaping Use    Vaping status: Never Used   Substance and Sexual Activity    Alcohol use: Yes     Comment: occasional    Drug use: Not Currently     Types: Marijuana    Sexual activity: Not on file   Other Topics Concern    Not on file   Social History Narrative    Not on file     Social Determinants of Health     Financial Resource Strain: Not on file    Food Insecurity: Not on file   Transportation Needs: Not on file   Physical Activity: Not on file   Stress: Not on file   Social Connections: Not on file   Intimate Partner Violence: Not on file   Housing Stability: Not on file     Social History     Substance and Sexual Activity   Alcohol Use Yes    Comment: occasional       Social History     Substance and Sexual Activity   Drug Use Not Currently    Types: Marijuana     Social History     Tobacco Use   Smoking Status Never    Passive exposure: Never   Smokeless Tobacco Never     Family History   Problem Relation Age of Onset    Leukemia Mother     No Known Problems Father      ==  Wang Molina, DO  Internal Medicine Residency, PGY-3

## 2024-01-23 NOTE — PRE-PROCEDURE INSTRUCTIONS
Pre-Surgery Instructions:   Medication Instructions    multivitamin (THERAGRAN) TABS Avoid 1 week prior to surgery      Medication instructions for day surgery reviewed. Please use only a sip of water to take your instructed medications. Avoid all over the counter vitamins, supplements and NSAIDS for one week prior to surgery per anesthesia guidelines. Tylenol is ok to take as needed.     You will receive a call one business day prior to surgery with an arrival time and hospital directions. If your surgery is scheduled on a Monday, the hospital will be calling you on the Friday prior to your surgery. If you have not heard from anyone by 8pm, please call the hospital supervisor through the hospital  at [phone]. ([place] [phone]).    Do not eat or drink anything after midnight the night before your surgery, including candy, mints, lifesavers, or chewing gum. Do not drink alcohol 24hrs before your surgery. Try not to smoke at least 24hrs before your surgery.       Follow the pre surgery showering instructions as listed in the “My Surgical Experience Booklet” or otherwise provided by your surgeon's office. Do not use a blade to shave the surgical area 1 week before surgery. It is okay to use a clean electric clippers up to 24 hours before surgery. Do not apply any lotions, creams, including makeup, cologne, deodorant, or perfumes after showering on the day of your surgery. Do not use dry shampoo, hair spray, hair gel, or any type of hair products.     No contact lenses, eye make-up, or artificial eyelashes. Remove nail polish, including gel polish, and any artificial, gel, or acrylic nails if possible. Remove all jewelry including rings and body piercing jewelry.     Wear causal clothing that is easy to take on and off. Consider your type of surgery.    Keep any valuables, jewelry, piercings at home. Please bring any specially ordered equipment (sling, braces) if indicated.    Arrange for a  responsible person to drive you to and from the hospital on the day of your surgery. Visitor Guidelines discussed.     Call the surgeon's office with any new illnesses, exposures, or additional questions prior to surgery.    Please reference your “My Surgical Experience Booklet” for additional information to prepare for your upcoming surgery.

## 2024-01-24 ENCOUNTER — TELEPHONE (OUTPATIENT)
Age: 59
End: 2024-01-24

## 2024-01-24 ENCOUNTER — HOSPITAL ENCOUNTER (OUTPATIENT)
Age: 59
Setting detail: OUTPATIENT SURGERY
Discharge: HOME | End: 2024-01-24
Attending: ORTHOPAEDIC SURGERY | Admitting: ORTHOPAEDIC SURGERY
Payer: COMMERCIAL

## 2024-01-24 ENCOUNTER — ANESTHESIA (OUTPATIENT)
Age: 59
End: 2024-01-24
Payer: COMMERCIAL

## 2024-01-24 VITALS
WEIGHT: 189 LBS | DIASTOLIC BLOOD PRESSURE: 90 MMHG | HEIGHT: 70 IN | OXYGEN SATURATION: 99 % | BODY MASS INDEX: 27.06 KG/M2 | HEART RATE: 82 BPM | SYSTOLIC BLOOD PRESSURE: 159 MMHG | TEMPERATURE: 97.7 F | RESPIRATION RATE: 20 BRPM

## 2024-01-24 DIAGNOSIS — Z98.890 STATUS POST KNEE SURGERY: Primary | ICD-10-CM

## 2024-01-24 LAB
GLUCOSE SERPL-MCNC: 234 MG/DL (ref 65–140)
GLUCOSE SERPL-MCNC: 236 MG/DL (ref 65–140)

## 2024-01-24 PROCEDURE — 29880 ARTHRS KNE SRG MNISECTMY M&L: CPT | Performed by: ORTHOPAEDIC SURGERY

## 2024-01-24 PROCEDURE — 29880 ARTHRS KNE SRG MNISECTMY M&L: CPT

## 2024-01-24 PROCEDURE — 82948 REAGENT STRIP/BLOOD GLUCOSE: CPT

## 2024-01-24 RX ORDER — PROPOFOL 10 MG/ML
INJECTION, EMULSION INTRAVENOUS AS NEEDED
Status: DISCONTINUED | OUTPATIENT
Start: 2024-01-24 | End: 2024-01-24

## 2024-01-24 RX ORDER — OXYCODONE HYDROCHLORIDE 5 MG/1
5 TABLET ORAL EVERY 4 HOURS PRN
Status: DISCONTINUED | OUTPATIENT
Start: 2024-01-24 | End: 2024-01-24 | Stop reason: HOSPADM

## 2024-01-24 RX ORDER — NAPROXEN 500 MG/1
500 TABLET ORAL 2 TIMES DAILY WITH MEALS
Qty: 60 TABLET | Refills: 0 | Status: SHIPPED | OUTPATIENT
Start: 2024-01-24

## 2024-01-24 RX ORDER — LIDOCAINE HYDROCHLORIDE 10 MG/ML
INJECTION, SOLUTION EPIDURAL; INFILTRATION; INTRACAUDAL; PERINEURAL AS NEEDED
Status: DISCONTINUED | OUTPATIENT
Start: 2024-01-24 | End: 2024-01-24

## 2024-01-24 RX ORDER — DEXAMETHASONE SODIUM PHOSPHATE 10 MG/ML
INJECTION, SOLUTION INTRAMUSCULAR; INTRAVENOUS AS NEEDED
Status: DISCONTINUED | OUTPATIENT
Start: 2024-01-24 | End: 2024-01-24

## 2024-01-24 RX ORDER — MIDAZOLAM HYDROCHLORIDE 2 MG/2ML
INJECTION, SOLUTION INTRAMUSCULAR; INTRAVENOUS AS NEEDED
Status: DISCONTINUED | OUTPATIENT
Start: 2024-01-24 | End: 2024-01-24

## 2024-01-24 RX ORDER — CHLORHEXIDINE GLUCONATE ORAL RINSE 1.2 MG/ML
15 SOLUTION DENTAL ONCE
Status: COMPLETED | OUTPATIENT
Start: 2024-01-24 | End: 2024-01-24

## 2024-01-24 RX ORDER — ACETAMINOPHEN 325 MG/1
325 TABLET ORAL EVERY 6 HOURS PRN
Qty: 30 TABLET | Refills: 0 | Status: SHIPPED | OUTPATIENT
Start: 2024-01-24

## 2024-01-24 RX ORDER — METOCLOPRAMIDE HYDROCHLORIDE 5 MG/ML
10 INJECTION INTRAMUSCULAR; INTRAVENOUS ONCE AS NEEDED
Status: DISCONTINUED | OUTPATIENT
Start: 2024-01-24 | End: 2024-01-24 | Stop reason: HOSPADM

## 2024-01-24 RX ORDER — SODIUM CHLORIDE, SODIUM LACTATE, POTASSIUM CHLORIDE, CALCIUM CHLORIDE 600; 310; 30; 20 MG/100ML; MG/100ML; MG/100ML; MG/100ML
125 INJECTION, SOLUTION INTRAVENOUS CONTINUOUS
Status: DISCONTINUED | OUTPATIENT
Start: 2024-01-24 | End: 2024-01-24 | Stop reason: HOSPADM

## 2024-01-24 RX ORDER — SODIUM CHLORIDE, SODIUM LACTATE, POTASSIUM CHLORIDE, CALCIUM CHLORIDE 600; 310; 30; 20 MG/100ML; MG/100ML; MG/100ML; MG/100ML
INJECTION, SOLUTION INTRAVENOUS CONTINUOUS PRN
Status: DISCONTINUED | OUTPATIENT
Start: 2024-01-24 | End: 2024-01-24

## 2024-01-24 RX ORDER — BUPIVACAINE HYDROCHLORIDE 5 MG/ML
INJECTION, SOLUTION EPIDURAL; INTRACAUDAL AS NEEDED
Status: DISCONTINUED | OUTPATIENT
Start: 2024-01-24 | End: 2024-01-24 | Stop reason: HOSPADM

## 2024-01-24 RX ORDER — OXYCODONE HYDROCHLORIDE 5 MG/1
5 TABLET ORAL EVERY 4 HOURS PRN
Qty: 15 TABLET | Refills: 0 | Status: SHIPPED | OUTPATIENT
Start: 2024-01-24

## 2024-01-24 RX ORDER — CEFAZOLIN SODIUM 2 G/50ML
2000 SOLUTION INTRAVENOUS ONCE
Status: COMPLETED | OUTPATIENT
Start: 2024-01-24 | End: 2024-01-24

## 2024-01-24 RX ORDER — HYDROMORPHONE HCL/PF 1 MG/ML
0.5 SYRINGE (ML) INJECTION
Status: DISCONTINUED | OUTPATIENT
Start: 2024-01-24 | End: 2024-01-24 | Stop reason: HOSPADM

## 2024-01-24 RX ORDER — FENTANYL CITRATE 50 UG/ML
INJECTION, SOLUTION INTRAMUSCULAR; INTRAVENOUS AS NEEDED
Status: DISCONTINUED | OUTPATIENT
Start: 2024-01-24 | End: 2024-01-24

## 2024-01-24 RX ORDER — ONDANSETRON 2 MG/ML
INJECTION INTRAMUSCULAR; INTRAVENOUS AS NEEDED
Status: DISCONTINUED | OUTPATIENT
Start: 2024-01-24 | End: 2024-01-24

## 2024-01-24 RX ORDER — FENTANYL CITRATE/PF 50 MCG/ML
50 SYRINGE (ML) INJECTION
Status: DISCONTINUED | OUTPATIENT
Start: 2024-01-24 | End: 2024-01-24 | Stop reason: HOSPADM

## 2024-01-24 RX ADMIN — HYDROMORPHONE HYDROCHLORIDE 0.5 MG: 1 INJECTION, SOLUTION INTRAMUSCULAR; INTRAVENOUS; SUBCUTANEOUS at 13:26

## 2024-01-24 RX ADMIN — FENTANYL CITRATE 25 MCG: 50 INJECTION INTRAMUSCULAR; INTRAVENOUS at 12:04

## 2024-01-24 RX ADMIN — FENTANYL CITRATE 25 MCG: 50 INJECTION INTRAMUSCULAR; INTRAVENOUS at 12:31

## 2024-01-24 RX ADMIN — LIDOCAINE HYDROCHLORIDE 50 MG: 10 INJECTION, SOLUTION EPIDURAL; INFILTRATION; INTRACAUDAL; PERINEURAL at 11:56

## 2024-01-24 RX ADMIN — CHLORHEXIDINE GLUCONATE 0.12% ORAL RINSE 15 ML: 1.2 LIQUID ORAL at 11:32

## 2024-01-24 RX ADMIN — CEFAZOLIN SODIUM 2000 MG: 2 SOLUTION INTRAVENOUS at 11:54

## 2024-01-24 RX ADMIN — DEXAMETHASONE SODIUM PHOSPHATE 10 MG: 10 INJECTION INTRAMUSCULAR; INTRAVENOUS at 11:58

## 2024-01-24 RX ADMIN — SODIUM CHLORIDE, SODIUM LACTATE, POTASSIUM CHLORIDE, AND CALCIUM CHLORIDE: .6; .31; .03; .02 INJECTION, SOLUTION INTRAVENOUS at 11:35

## 2024-01-24 RX ADMIN — FENTANYL CITRATE 25 MCG: 50 INJECTION INTRAMUSCULAR; INTRAVENOUS at 12:00

## 2024-01-24 RX ADMIN — FENTANYL CITRATE 50 MCG: 50 INJECTION INTRAMUSCULAR; INTRAVENOUS at 13:04

## 2024-01-24 RX ADMIN — OXYCODONE HYDROCHLORIDE 5 MG: 5 TABLET ORAL at 14:13

## 2024-01-24 RX ADMIN — SODIUM CHLORIDE, SODIUM LACTATE, POTASSIUM CHLORIDE, AND CALCIUM CHLORIDE 125 ML/HR: .6; .31; .03; .02 INJECTION, SOLUTION INTRAVENOUS at 11:30

## 2024-01-24 RX ADMIN — PROPOFOL 250 MG: 10 INJECTION, EMULSION INTRAVENOUS at 11:56

## 2024-01-24 RX ADMIN — MIDAZOLAM 2 MG: 1 INJECTION INTRAMUSCULAR; INTRAVENOUS at 11:52

## 2024-01-24 RX ADMIN — ONDANSETRON 4 MG: 2 INJECTION INTRAMUSCULAR; INTRAVENOUS at 11:58

## 2024-01-24 RX ADMIN — FENTANYL CITRATE 25 MCG: 50 INJECTION INTRAMUSCULAR; INTRAVENOUS at 12:24

## 2024-01-24 NOTE — ANESTHESIA PREPROCEDURE EVALUATION
Procedure:  ARTHROSCOPY KNEE for medial menisectomy versus repair plus all other indicated procedures (Left: Knee)    Relevant Problems   MUSCULOSKELETAL   (+) Primary osteoarthritis of left knee        Physical Exam    Airway    Mallampati score: I  TM Distance: >3 FB  Neck ROM: full     Dental   No notable dental hx     Cardiovascular      Pulmonary      Other Findings        Anesthesia Plan  ASA Score- 2     Anesthesia Type- general with ASA Monitors.         Additional Monitors:     Airway Plan: LMA.           Plan Factors-Exercise tolerance (METS): >4 METS.    Chart reviewed. EKG reviewed.  Existing labs reviewed. Patient summary reviewed.    Patient is not a current smoker.      There is medical exclusion for perioperative obstructive sleep apnea risk education.        Induction- intravenous.    Postoperative Plan- Plan for postoperative opioid use.     Informed Consent- Anesthetic plan and risks discussed with patient.  I personally reviewed this patient with the CRNA. Discussed and agreed on the Anesthesia Plan with the CRNA..

## 2024-01-24 NOTE — OP NOTE
OPERATIVE REPORT  PATIENT NAME: Suhas Jones    :  1965  MRN: [id number]  Pt Location: [facility] OR ROOM 06    SURGERY DATE: 2024    Surgeons and Role:     * Himanshu Redman MD - Primary     * Jericho Lua PA-C - Assisting    Preop Diagnosis:  Complex tear of medial meniscus of left knee as current injury, subsequent encounter [S83.232D]    Post-Op Diagnosis Codes:     * Complex tear of medial meniscus of left knee as current injury, subsequent encounter [S83.232D]    Procedure(s):  Left - ARTHROSCOPY KNEE for medial menisectomy    Specimen(s):  * No specimens in log *    Estimated Blood Loss:   Minimal    Drains:  * No LDAs found *    Anesthesia Type:   General    Operative Indications:  Complex tear of medial meniscus of left knee as current injury, subsequent encounter [S83.232D]    Operative Findings:  As expected    Complications:   None    Procedure and Technique:  SURGEON(S) AND ROLE  Himanshu Redman      PROCEDURE(S)   left KNEE:  1. Left knee arthroscopy, medial meniscal debridement  2. Lateral meniscal debridement     WOUND TYPE: 1     DRAINS  None.     COMPLICATIONS  None.     FINDINGS:  See below      ANESTHESIA TYPE  Regional + LMA     IMPLANTS:  Nothing implanted    ESTIMATED BLOOD LOSS: < 25 ml    SPECIMENS: none     INDICATION:  Suhas Jones is a 58 y.o. male who was diagnosed with a complex tear of the medial meniscus based on history, examination, and advanced imaging. Accordingly, after a lengthy discussion of the risks and benefits and alternatives to both operative and non-operative treatment, he elected to proceed with operative intervention, and I am in agreement. Please see my clinical documentation for additional details on the history, exam, and discussion regarding surgical decision making.     PRE-PROCEDURE PROTOCOL:  The patient was identified in the preoperative holding area where informed consent and surgical site marking were confirmed. The patient was then  transferred to the operating room where anesthesia was administered by our Anesthesia colleagues in accordance with our preoperative plan. The operative extremity was prepped and draped in the usual sterile fashion. A procedural pause was performed to verify correct patient identification, procedure to be performed, laterality, agreement of all team members, availability of all equipment, and administration of preoperative antibiotics. Afterwards, the procedure commenced.     DESCRIPTION OF PROCEDURE  Examination of the knee under anesthesia was performed. This revealed 1A lachman with 0-135 degrees of motion.     Diagnostic arthroscopy was performed by inserting the camera through an inferolateral portal. An inferomedial portal was created under needle localization. Findings were as follows:     Medial Compartment: Complex tear of the medial meniscus posterior horn.  This was debrided with a compbination of a shaver and a biter to a stable margin. Approximately 20% of the posterior horn was excised.  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      Lateral compartment: Cartilage intact and within normal limits. The posterior horn had a small 3 mm complex tear of the posterior horn that was gently debrided with a shaver. Less than 5% of the meniscus was excised.       Trochlea: Cartilage intact and within normal limits.      Patella: Cartilage intact and within normal limits     There was no evidence of loose bodies or other pathology in the suprapatellar pouch, medial gutter, or lateral gutter.     The arthroscopic equipment was removed and fluid drained from the knee. The wound was closed in a standard fashion. A sterile dressing was applied. The patient was transferred to the PACU where she recovered without complication.     POSTOPERATIVE ANN     POSTOPERATIVE REHABILITATION: [facility], advancement with PT as indicated.       FOLLOW-UP: We will plan on seeing her back in approximately  1-2 weeks for wound check, sutures out and advancement of physical therapy as indicated.     A physician assistant was required during the procedure for retraction, tissue handling, dissection and suturing. There was no qualified resident available to assist       Patient Disposition:  PACU          I was present for the entire procedure.    Patient Disposition:  PACU         SIGNATURE: Himanshu Redman MD  DATE: January 24, 2024  TIME: 2:52 PM

## 2024-01-24 NOTE — TELEPHONE ENCOUNTER
Caller: PHARMACY    Doctor: ALIDA    Reason for call: Called to confirm if patient had surgery today    Call back#: n/a

## 2024-01-24 NOTE — ANESTHESIA POSTPROCEDURE EVALUATION
Post-Op Assessment Note    CV Status:  Stable  Pain Score: 0    Pain management: adequate       Mental Status:  Alert and awake   Hydration Status:  Euvolemic   PONV Controlled:  Controlled   Airway Patency:  Patent  Two or more mitigation strategies used for obstructive sleep apnea   Post Op Vitals Reviewed: Yes    No anethesia notable event occurred.    Staff: Anesthesiologist, ETELVINA               BP (!) 181/92 (01/24/24 1257)    Temp 97.7 °F (36.5 °C) (01/24/24 1257)    Pulse 78 (01/24/24 1257)   Resp 13 (01/24/24 1257)    SpO2 100 % (01/24/24 1257)

## 2024-01-24 NOTE — DISCHARGE INSTR - AVS FIRST PAGE
POSTOPERATIVE INSTRUCTIONS following KNEE MENISECTOMY    MEDICATIONS:  Resume all home medications unless otherwise instructed by your surgeon.  Pain Medication:  Oxycodone 5 mg, 1 tablets every 4 hours as needed  If you were given a regional anesthetic (nerve block), please begin taking the pain medication as soon as you get home, even if you have minimal or no pain.  DO NOT WAIT FOR THE NERVE BLOCK TO WEAR OFF.  Possible side effects include nausea, constipation, and urinary retention.  If you experience these side effects, please call our office for assistance.  Pain med refills are authorized only during office hours (8am-4pm, Mon-Fri).  Anti-Inflammatory:  Naproxen 500 mg, 1 tablet every 12 hours for 4 weeks and Tylenol 325 mg, 1-2 tablets every 6 hours for 4 weeks  Take with food.  Stop if you experience nausea, reflux, or stomach pain.  Blood Clot Prevention:  Not Necessary  Pump your foot up and down 20 times per hour while you are less mobile.  If you were previously on an anticoagulation medication (blood thinner) you may begin this the following morning    WOUND CARE:  Keep the dressing clean and dry.  Light drainage may occur the first 2 days postop.  You may remove the dressings and get the incision wet in the shower 72 hours after surgery.  Do not remove steri-strips or sutures.  Do not immerse the incision under water.  Carefully pat the incision dry.  If there is wound drainage, re-apply a fresh dry gauze dressing.  Please call our office ([phone]) if you experience either of the following:  Sudden increase in swelling, redness, or warmth at the surgical site  Excessive incisional drainage that persists beyond the 3rd day after surgery  Oral temperature greater than 101 degrees, not relieved with Tylenol  Shortness of breath, chest pain, nausea, or any other concerning symptoms    SWELLING CONTROL:  Cold Therapy:  The cold therapy device may be used either continuously or only as needed,  "according to your preference.  Do not let the pad directly touch your skin.  Alternatively, apply ice (20 min on, 20 min off) as often as you feel is necessary.  Elevation:  Elevate the entire leg above heart level.  Place pillows under your ankle to keep your knee straight.  Compression:  Apply ACE wraps or a thigh-length compression stocking as needed.    RANGE OF MOTION:  You are allowed full range of motion as tolerated.    IMMOBILIZATION:  None.  You are allowed full range of motion as tolerated.    ACTIVITY:   Bear full weight as tolerated on the operative leg. Use crutches to assist only as needed.  Using Crutches on Stairs:  Going up, lead with your \"good\" (nonoperative) leg.  Going down, lead with your \"bad\" (operative) leg.  Use a hand rail when available.  Knee Extension:  Place a rolled towel or pillow under your ankle for 20-30 minutes 3-5 times per day.  This will help to maintain full knee extension.  Quad Sets:  Sit or lie with your knee straight.  Tighten your quadriceps (front thigh) muscle.  Hold for 3 seconds, then relax.  Repeat 20 times per hour while awake.    PHYSICAL THERAPY:  Begin therapy 5 to 7 days after surgery. We have placed a referral for physical therapy after the procedure to begin knee range of motion and strengthening. Please call to get set-up with physical therapy.    FOLLOW-UP APPOINTMENT:  10-14 days after surgery with:    Dr. Himanshu Redman MD  [facility]  [address] [place], [address] ([facility])  [address] [place], [address] [facility])  [phone]   "

## 2024-02-02 ENCOUNTER — OFFICE VISIT (OUTPATIENT)
Dept: OBGYN CLINIC | Facility: CLINIC | Age: 59
End: 2024-02-02

## 2024-02-02 VITALS
HEART RATE: 76 BPM | BODY MASS INDEX: 28.06 KG/M2 | DIASTOLIC BLOOD PRESSURE: 72 MMHG | SYSTOLIC BLOOD PRESSURE: 132 MMHG | HEIGHT: 70 IN | WEIGHT: 196 LBS

## 2024-02-02 DIAGNOSIS — Z98.890 STATUS POST ARTHROSCOPY OF LEFT KNEE: Primary | ICD-10-CM

## 2024-02-02 PROCEDURE — 99024 POSTOP FOLLOW-UP VISIT: CPT | Performed by: ORTHOPAEDIC SURGERY

## 2024-02-02 NOTE — PROGRESS NOTES
Subjective   CHIEF COMPLAINT/REASON FOR VISIT  Suhas Jones is a 58 y.o. male who presents for 10 day post op follow-up after ARTHROSCOPY KNEE for medial menisectomy - Left on 1/24/2024     HISTORY OF PRESENT ILLNESS  Overall, he feels things are going well and progressing appropriately. Pain has been well controlled. Currently, he is doing well without any specific concerns.     Objective   PHYSICAL EXAMINATION  Left  Knee  Surgical incisions are healed.   ROM 0-100  Sensation intact distally  Skin warm and perfused      Assessment/Plan   1. Status Post ARTHROSCOPY KNEE for medial menisectomy - Left    He is doing well after surgery and making appropriate progress. Sutures removed Encouraged to slowly return to activity as tolerated and use pain as his guide.    We will plan to see him back prn. If any issues, questions, or concerns arise between now and the next appointment, we have encouraged the patient contact our team.        Scribe Attestation      I,:  Jericho Lua PA-C am acting as a scribe while in the presence of the attending physician.:       I,:  Himanshu Redman MD personally performed the services described in this documentation    as scribed in my presence.:

## 2025-02-17 ENCOUNTER — TELEPHONE (OUTPATIENT)
Dept: OBGYN CLINIC | Facility: HOSPITAL | Age: 60
End: 2025-02-17

## (undated) DEVICE — GLOVE INDICATOR PI UNDERGLOVE SZ 8 BLUE

## (undated) DEVICE — SURGICAL GOWN, XL SMARTSLEEVE: Brand: CONVERTORS

## (undated) DEVICE — TIBURON SPLIT SHEET: Brand: CONVERTORS

## (undated) DEVICE — ACE WRAP 6 IN UNSTERILE

## (undated) DEVICE — 4-PORT MANIFOLD: Brand: NEPTUNE 2

## (undated) DEVICE — COBAN 6 IN STERILE

## (undated) DEVICE — DRAPE SHEET THREE QUARTER

## (undated) DEVICE — ABDOMINAL PAD: Brand: DERMACEA

## (undated) DEVICE — CUFF TOURNIQUET 30 X 4 IN QUICK CONNECT DISP 1BLA

## (undated) DEVICE — DECANTER: Brand: UNBRANDED

## (undated) DEVICE — ARTHROSCOPY FLOOR MAT

## (undated) DEVICE — IMPERVIOUS STOCKINETTE: Brand: DEROYAL

## (undated) DEVICE — BLADE SHAVER DISSECTOR 4MM 13CM COOLCUT

## (undated) DEVICE — OCCLUSIVE GAUZE STRIP,3% BISMUTH TRIBROMOPHENATE IN PETROLATUM BLEND: Brand: XEROFORM

## (undated) DEVICE — INTENDED FOR TISSUE SEPARATION, AND OTHER PROCEDURES THAT REQUIRE A SHARP SURGICAL BLADE TO PUNCTURE OR CUT.: Brand: BARD-PARKER ® CARBON RIB-BACK BLADES

## (undated) DEVICE — BETHLEHEM UNIVERSAL  ARTHRO PK: Brand: CARDINAL HEALTH

## (undated) DEVICE — 3M™ STERI-DRAPE™ U-DRAPE 1015: Brand: STERI-DRAPE™

## (undated) DEVICE — SUT ETHILON 3-0 PS-1 18 IN 1663G

## (undated) DEVICE — WEBRIL 6 IN UNSTERILE

## (undated) DEVICE — GLOVE SRG BIOGEL 7.5

## (undated) DEVICE — TUBING ARTHROSCOPIC WAVE  MAIN PUMP

## (undated) DEVICE — TUBING SUCTION 5MM X 12 FT

## (undated) DEVICE — PADDING CAST 6IN COTTON STRL

## (undated) DEVICE — CHLORAPREP HI-LITE 26ML ORANGE